# Patient Record
(demographics unavailable — no encounter records)

---

## 2025-01-24 NOTE — PLAN
[FreeTextEntry1] : Umbilical hernia. Elective repair. Pt accepts post stress test. total time > 47 minutes

## 2025-01-24 NOTE — HISTORY OF PRESENT ILLNESS
[de-identified] : 61 yo male with an umbilical hernia. Was admitted to  last week. Had CT for ? SBO. Resolved without surgery. Here to consider elective repair.

## 2025-01-24 NOTE — CONSULT LETTER
[Dear  ___] : Dear  [unfilled], [Consult Letter:] : I had the pleasure of evaluating your patient, [unfilled]. [Please see my note below.] : Please see my note below. [Consult Closing:] : Thank you very much for allowing me to participate in the care of this patient.  If you have any questions, please do not hesitate to contact me. [Sincerely,] : Sincerely, [FreeTextEntry3] : Wm Xavier OVALLE

## 2025-01-24 NOTE — PHYSICAL EXAM
[JVD] : no jugular venous distention  [Normal Breath Sounds] : Normal breath sounds [Normal Heart Sounds] : normal heart sounds [Abdomen Tenderness] : ~T ~M No abdominal tenderness [No HSM] : no hepatosplenomegaly [No Rash or Lesion] : No rash or lesion [Alert] : alert [Oriented to Person] : oriented to person [Oriented to Place] : oriented to place [Oriented to Time] : oriented to time [Calm] : calm [de-identified] : NAD [de-identified] : NC/AT PER [de-identified] : soft, obese, reducible hernia [de-identified] : no CVA tenderness [de-identified] : moves all 4 extr 5/5

## 2025-03-31 NOTE — PHYSICAL EXAM
[Abdomen Tenderness] : ~T ~M No abdominal tenderness [Calm] : calm [de-identified] : some dark skin at umbilicus

## 2025-03-31 NOTE — HISTORY OF PRESENT ILLNESS
[de-identified] : 61 yo male POD# 3 from umbilical hernia repair. PAT drain 15 to 40 cc per 12 hours.

## 2025-04-30 NOTE — PHYSICAL EXAM
[General Appearance - Well Developed] : well developed [General Appearance - Well Nourished] : well nourished [Heart Rate And Rhythm] : heart rate and rhythm were normal [] : no respiratory distress [Abdomen Soft] : soft [Urethral Meatus] : meatus normal [Penis Abnormality] : normal circumcised penis [Epididymis] : the epididymides were normal [Testes Tenderness] : no tenderness of the testes [Testes Mass (___cm)] : there were no testicular masses [Normal Station and Gait] : the gait and station were normal for the patient's age [Skin Color & Pigmentation] : normal skin color and pigmentation [No Focal Deficits] : no focal deficits [Oriented To Time, Place, And Person] : oriented to person, place, and time [Not Anxious] : not anxious

## 2025-04-30 NOTE — ASSESSMENT
[FreeTextEntry1] : 59 yo male with BPH/LUTS and elevated PVR.  He is having acute symptoms of prostatitis with a negative Ucx.  Will switch abx to Bactrim, he will return if he sees no improvement after 7days of abx.  Will resend UA and UCx as well.  He is scheduled for HoLEP with Dr. Valle in June.

## 2025-04-30 NOTE — HISTORY OF PRESENT ILLNESS
[FreeTextEntry1] : 3/23/22: 58 yo male presents with concern for prostatitis -like symptoms that first started in late December. He has seen two other urologists who treated him for a UTI at first and then prostatitis,.  He was found to have salmonella in one of his urine cultures.  He was treated with a 2 week course of Bactrim.  His symptoms resolved.  This past Thursday and Friday he again developed prostatitis -like symptoms with dysuria and urinary frequency..  However, these symptoms resolved on their own.  His most recent Ucx was negative (3/17/22) and his PSA from 1/12/22 was 2.6.  At baseline he has LUTS consisting of urinary frequency, urgency, occasional urge incontinence, hesitancy and slow stream.  he has been on tamsulosin 0.4 mg for several years.    IPSS = 28 QoL = 4 PVR = 141 cc  *************** 6/27/22: 58 yo male returns with new complaints of prostatitis -like symptoms with perineal and perianal discomfort as well as dysuria and increased difficulty voiding.  He denies fevers chills nausea and vomiting.  The UA and Ucx at his last visit was negative.  CTU from April was also unremarkable.  He generally voids well with tamsulosin and he has been taking the medication consistently on a daily basis.   PVR = 63 cc Urine dip + for nitrites  *************** 11/16/22: 58 yr old male with CC of acute onset urinary frequency, urgency and dysuria which began on Friday 11/11. He denies fever, chills, or gross hematuria. He started taking leftover Bactrim antibiotics that he had from ID on Sunday and the last dose was last night on Tuesday 11/15/22. His symptoms are improved since beginning the abx. He has a history of Salmonella UTI which he has been treated with Bactrim by ID for 1 month (6 tabs/daily). He was doing well since end of September, but then symptoms returned.   He is interested in discussing prostate procedures due to the recurrent infections. The length of the prostate on recent CT pelvis with IV contrast was negative for abscess and showed enlarged prostate measuring 6.5 cm. No volume recorded. Done at [facility] imaging. Right kidney on US showed no stones.   UA: trace WBCs PVR: 236 ml, 2nd void 182 ml   ************* 12/29/22: 57 yo male with hx of BPH/LUTS on tamsulosin and recurrent salmonella infections returns with acute worsening of urinary frequency.  He thinks he may have another salmonella infection.  He denies fevers, chills, nausea, vomiting, or hematuria.  He completed a 1 month course of high dose Bactrim, prescribed by his ID doctor, to try to eliminate his salmonella infection.  A Ucx from 12/9/22 was negative.  PVR = 35 cc  ******************** 4/26/23: Patient returns to discuss options for his chronic LUTS and infections. He recently finished a 6 week course of home IV antibiotics and had a negative UCx. His voiding pattern is essentially unchanged. He has frequency, urgency, weak stream, hesitancy, and nocturia x2. His symptoms are worse at night.   PVR: 184 ml IPSS: 24, QOL: 4   ********************* 5/8/23: Patient returns for follow up/  He has had 2 negative urine cultures since completing 6 weeks of abx.  His most recent PSA was from 5/3/23 = 1.5.  He is voiding with his baseline LUTS.   Cystoscopy today reveals a large obstructing prostate with enlarged median lobe, TRUS measured the prostate at 138 cc.   ************************ 8/16/23: referred by Dr. Gaming to discuss prostate debulking procedures. No recurrent infections. Remains bothered by frequency, urgency and in particular occasional difficulty urinating.   IPSS 24, QOL 5, PVR 2 cc  ******************* 2/11/25: Returns with worsening LUTS, started taking flomax 0.8 mg daily which has helped. Still with frequency, urgency, occasional urge incontinence, difficulty urinating, incomplete emptying.   cc  ******** 4/30/25: Patient returns with 10 days of perineal pain and dysuria with voiding.  he tried a 7 day course of cipro but saw no improvement.  His PCP sent a UA and Ucx which was negative for bacteria, but did show microscopic hematuria.  He denies fevers or chills.

## 2025-05-12 NOTE — HISTORY OF PRESENT ILLNESS
[FreeTextEntry1] : 3/23/22: 56 yo male presents with concern for prostatitis -like symptoms that first started in late December. He has seen two other urologists who treated him for a UTI at first and then prostatitis,.  He was found to have salmonella in one of his urine cultures.  He was treated with a 2 week course of Bactrim.  His symptoms resolved.  This past Thursday and Friday he again developed prostatitis -like symptoms with dysuria and urinary frequency..  However, these symptoms resolved on their own.  His most recent Ucx was negative (3/17/22) and his PSA from 1/12/22 was 2.6.  At baseline he has LUTS consisting of urinary frequency, urgency, occasional urge incontinence, hesitancy and slow stream.  he has been on tamsulosin 0.4 mg for several years.    IPSS = 28 QoL = 4 PVR = 141 cc  *************** 6/27/22: 56 yo male returns with new complaints of prostatitis -like symptoms with perineal and perianal discomfort as well as dysuria and increased difficulty voiding.  He denies fevers chills nausea and vomiting.  The UA and Ucx at his last visit was negative.  CTU from April was also unremarkable.  He generally voids well with tamsulosin and he has been taking the medication consistently on a daily basis.   PVR = 63 cc Urine dip + for nitrites  *************** 11/16/22: 58 yr old male with CC of acute onset urinary frequency, urgency and dysuria which began on Friday 11/11. He denies fever, chills, or gross hematuria. He started taking leftover Bactrim antibiotics that he had from ID on Sunday and the last dose was last night on Tuesday 11/15/22. His symptoms are improved since beginning the abx. He has a history of Salmonella UTI which he has been treated with Bactrim by ID for 1 month (6 tabs/daily). He was doing well since end of September, but then symptoms returned.   He is interested in discussing prostate procedures due to the recurrent infections. The length of the prostate on recent CT pelvis with IV contrast was negative for abscess and showed enlarged prostate measuring 6.5 cm. No volume recorded. Done at [facility] imaging. Right kidney on US showed no stones.   UA: trace WBCs PVR: 236 ml, 2nd void 182 ml   ************* 12/29/22: 57 yo male with hx of BPH/LUTS on tamsulosin and recurrent salmonella infections returns with acute worsening of urinary frequency.  He thinks he may have another salmonella infection.  He denies fevers, chills, nausea, vomiting, or hematuria.  He completed a 1 month course of high dose Bactrim, prescribed by his ID doctor, to try to eliminate his salmonella infection.  A Ucx from 12/9/22 was negative.  PVR = 35 cc  ******************** 4/26/23: Patient returns to discuss options for his chronic LUTS and infections. He recently finished a 6 week course of home IV antibiotics and had a negative UCx. His voiding pattern is essentially unchanged. He has frequency, urgency, weak stream, hesitancy, and nocturia x2. His symptoms are worse at night.   PVR: 184 ml IPSS: 24, QOL: 4   ********************* 5/8/23: Patient returns for follow up/  He has had 2 negative urine cultures since completing 6 weeks of abx.  His most recent PSA was from 5/3/23 = 1.5.  He is voiding with his baseline LUTS.   Cystoscopy today reveals a large obstructing prostate with enlarged median lobe, TRUS measured the prostate at 138 cc.   ************************ 8/16/23: referred by Dr. Gaming to discuss prostate debulking procedures. No recurrent infections. Remains bothered by frequency, urgency and in particular occasional difficulty urinating.   IPSS 24, QOL 5, PVR 2 cc  ******************* 2/11/25: Returns with worsening LUTS, started taking flomax 0.8 mg daily which has helped. Still with frequency, urgency, occasional urge incontinence, difficulty urinating, incomplete emptying.   cc  ******** 4/30/25: Patient returns with 10 days of perineal pain and dysuria with voiding.  he tried a 7 day course of cipro but saw no improvement.  His PCP sent a UA and Ucx which was negative for bacteria, but did show microscopic hematuria.  He denies fevers or chills.   ************ 5/12/25: Patient returns to review CT scan.  CT shows serval large bladder stones.  This is likely the cause of his pain.  He is scheduled for surgery in June for HoLEP and cystolithotripsy.  NSAIDs, phenazopyridine, and tamsulosin have been minimally effective.

## 2025-05-12 NOTE — ASSESSMENT
[FreeTextEntry1] : 61 yo male with BPH/LUTS, urinary retention and bladder stones.  His symptoms are related to the stones.  he is scheduled for surgery in June.  He will continue to address his symptoms with medications provided and I will send in an Rx for hydrocodone to be used only with severe pain.  He will follow up with Dr. Valle as scheduled for surgery.

## 2025-07-22 NOTE — HISTORY OF PRESENT ILLNESS
[FreeTextEntry1] : 3/23/22: 58 yo male presents with concern for prostatitis -like symptoms that first started in late December. He has seen two other urologists who treated him for a UTI at first and then prostatitis,.  He was found to have salmonella in one of his urine cultures.  He was treated with a 2 week course of Bactrim.  His symptoms resolved.  This past Thursday and Friday he again developed prostatitis -like symptoms with dysuria and urinary frequency..  However, these symptoms resolved on their own.  His most recent Ucx was negative (3/17/22) and his PSA from 1/12/22 was 2.6.  At baseline he has LUTS consisting of urinary frequency, urgency, occasional urge incontinence, hesitancy and slow stream.  he has been on tamsulosin 0.4 mg for several years.    IPSS = 28 QoL = 4 PVR = 141 cc  *************** 6/27/22: 58 yo male returns with new complaints of prostatitis -like symptoms with perineal and perianal discomfort as well as dysuria and increased difficulty voiding.  He denies fevers chills nausea and vomiting.  The UA and Ucx at his last visit was negative.  CTU from April was also unremarkable.  He generally voids well with tamsulosin and he has been taking the medication consistently on a daily basis.   PVR = 63 cc Urine dip + for nitrites  *************** 11/16/22: 58 yr old male with CC of acute onset urinary frequency, urgency and dysuria which began on Friday 11/11. He denies fever, chills, or gross hematuria. He started taking leftover Bactrim antibiotics that he had from ID on Sunday and the last dose was last night on Tuesday 11/15/22. His symptoms are improved since beginning the abx. He has a history of Salmonella UTI which he has been treated with Bactrim by ID for 1 month (6 tabs/daily). He was doing well since end of September, but then symptoms returned.   He is interested in discussing prostate procedures due to the recurrent infections. The length of the prostate on recent CT pelvis with IV contrast was negative for abscess and showed enlarged prostate measuring 6.5 cm. No volume recorded. Done at [facility] imaging. Right kidney on US showed no stones.   UA: trace WBCs PVR: 236 ml, 2nd void 182 ml   ************* 12/29/22: 57 yo male with hx of BPH/LUTS on tamsulosin and recurrent salmonella infections returns with acute worsening of urinary frequency.  He thinks he may have another salmonella infection.  He denies fevers, chills, nausea, vomiting, or hematuria.  He completed a 1 month course of high dose Bactrim, prescribed by his ID doctor, to try to eliminate his salmonella infection.  A Ucx from 12/9/22 was negative.  PVR = 35 cc  ******************** 4/26/23: Patient returns to discuss options for his chronic LUTS and infections. He recently finished a 6 week course of home IV antibiotics and had a negative UCx. His voiding pattern is essentially unchanged. He has frequency, urgency, weak stream, hesitancy, and nocturia x2. His symptoms are worse at night.   PVR: 184 ml IPSS: 24, QOL: 4   ********************* 5/8/23: Patient returns for follow up/  He has had 2 negative urine cultures since completing 6 weeks of abx.  His most recent PSA was from 5/3/23 = 1.5.  He is voiding with his baseline LUTS.   Cystoscopy today reveals a large obstructing prostate with enlarged median lobe, TRUS measured the prostate at 138 cc.   ************************ 8/16/23: referred by Dr. Gaming to discuss prostate debulking procedures. No recurrent infections. Remains bothered by frequency, urgency and in particular occasional difficulty urinating.   IPSS 24, QOL 5, PVR 2 cc  ******************* 2/11/25: Returns with worsening LUTS, started taking flomax 0.8 mg daily which has helped. Still with frequency, urgency, occasional urge incontinence, difficulty urinating, incomplete emptying.   cc  ******** 4/30/25: Patient returns with 10 days of perineal pain and dysuria with voiding.  he tried a 7 day course of cipro but saw no improvement.  His PCP sent a UA and Ucx which was negative for bacteria, but did show microscopic hematuria.  He denies fevers or chills.   ************ 5/12/25: Patient returns to review CT scan.  CT shows serval large bladder stones.  This is likely the cause of his pain.  He is scheduled for surgery in June for HoLEP and cystolithotripsy.  NSAIDs, phenazopyridine, and tamsulosin have been minimally effective.    7/21/25: S/p HoLEP at [facility] on 6/19/25. Doing well with strong stream, resolved hematuria, no stress incontinence, some urgency with urge incontinence. Having some rectal pain. Nocturia x2 improved from x5.    IPSS:9/3 PVR: 8 ml (s/p HoLEP)

## 2025-07-22 NOTE — HISTORY OF PRESENT ILLNESS
[FreeTextEntry1] : 3/23/22: 56 yo male presents with concern for prostatitis -like symptoms that first started in late December. He has seen two other urologists who treated him for a UTI at first and then prostatitis,.  He was found to have salmonella in one of his urine cultures.  He was treated with a 2 week course of Bactrim.  His symptoms resolved.  This past Thursday and Friday he again developed prostatitis -like symptoms with dysuria and urinary frequency..  However, these symptoms resolved on their own.  His most recent Ucx was negative (3/17/22) and his PSA from 1/12/22 was 2.6.  At baseline he has LUTS consisting of urinary frequency, urgency, occasional urge incontinence, hesitancy and slow stream.  he has been on tamsulosin 0.4 mg for several years.    IPSS = 28 QoL = 4 PVR = 141 cc  *************** 6/27/22: 56 yo male returns with new complaints of prostatitis -like symptoms with perineal and perianal discomfort as well as dysuria and increased difficulty voiding.  He denies fevers chills nausea and vomiting.  The UA and Ucx at his last visit was negative.  CTU from April was also unremarkable.  He generally voids well with tamsulosin and he has been taking the medication consistently on a daily basis.   PVR = 63 cc Urine dip + for nitrites  *************** 11/16/22: 58 yr old male with CC of acute onset urinary frequency, urgency and dysuria which began on Friday 11/11. He denies fever, chills, or gross hematuria. He started taking leftover Bactrim antibiotics that he had from ID on Sunday and the last dose was last night on Tuesday 11/15/22. His symptoms are improved since beginning the abx. He has a history of Salmonella UTI which he has been treated with Bactrim by ID for 1 month (6 tabs/daily). He was doing well since end of September, but then symptoms returned.   He is interested in discussing prostate procedures due to the recurrent infections. The length of the prostate on recent CT pelvis with IV contrast was negative for abscess and showed enlarged prostate measuring 6.5 cm. No volume recorded. Done at [facility] imaging. Right kidney on US showed no stones.   UA: trace WBCs PVR: 236 ml, 2nd void 182 ml   ************* 12/29/22: 59 yo male with hx of BPH/LUTS on tamsulosin and recurrent salmonella infections returns with acute worsening of urinary frequency.  He thinks he may have another salmonella infection.  He denies fevers, chills, nausea, vomiting, or hematuria.  He completed a 1 month course of high dose Bactrim, prescribed by his ID doctor, to try to eliminate his salmonella infection.  A Ucx from 12/9/22 was negative.  PVR = 35 cc  ******************** 4/26/23: Patient returns to discuss options for his chronic LUTS and infections. He recently finished a 6 week course of home IV antibiotics and had a negative UCx. His voiding pattern is essentially unchanged. He has frequency, urgency, weak stream, hesitancy, and nocturia x2. His symptoms are worse at night.   PVR: 184 ml IPSS: 24, QOL: 4   ********************* 5/8/23: Patient returns for follow up/  He has had 2 negative urine cultures since completing 6 weeks of abx.  His most recent PSA was from 5/3/23 = 1.5.  He is voiding with his baseline LUTS.   Cystoscopy today reveals a large obstructing prostate with enlarged median lobe, TRUS measured the prostate at 138 cc.   ************************ 8/16/23: referred by Dr. Gaming to discuss prostate debulking procedures. No recurrent infections. Remains bothered by frequency, urgency and in particular occasional difficulty urinating.   IPSS 24, QOL 5, PVR 2 cc  ******************* 2/11/25: Returns with worsening LUTS, started taking flomax 0.8 mg daily which has helped. Still with frequency, urgency, occasional urge incontinence, difficulty urinating, incomplete emptying.   cc  ******** 4/30/25: Patient returns with 10 days of perineal pain and dysuria with voiding.  he tried a 7 day course of cipro but saw no improvement.  His PCP sent a UA and Ucx which was negative for bacteria, but did show microscopic hematuria.  He denies fevers or chills.   ************ 5/12/25: Patient returns to review CT scan.  CT shows serval large bladder stones.  This is likely the cause of his pain.  He is scheduled for surgery in June for HoLEP and cystolithotripsy.  NSAIDs, phenazopyridine, and tamsulosin have been minimally effective.    7/21/25: S/p HoLEP at [facility] on 6/19/25. Doing well with strong stream, resolved hematuria, no stress incontinence, some urgency with urge incontinence. Having some rectal pain. Nocturia x2 improved from x5.    IPSS:9/3 PVR: 8 ml (s/p HoLEP)

## 2025-07-22 NOTE — END OF VISIT
[FreeTextEntry3] :   I, Dr. Valle, personally performed the evaluation and management (E/M) services for this established patient who presents today with an exacerbation of an existing condition. That E/M includes conducting the examination, assessing all exacerbated conditions, and establishing a plan of care.  Today, my ACP, Charisma Lyons, was here to observe my evaluation and management services for this exacerbated condition to be followed going forward.

## 2025-07-22 NOTE — ASSESSMENT
[FreeTextEntry1] : 59 yo male with BPH/LUTS, urinary retention and bladder stones. 138 cc prostate.   S/p HoLEP w/ cystolitholopaxy at [facility] on 6/19/25. Successful TOV POD-1. Pathology with 106 cc BPH. Doing well with strong stream, complete emptying, no hematuria, no HARJINDER some urgency with UUI but overall symptoms improved. Having some rectal pain/pain with ejaculation. Discussed may still be inflammation/healing post-surgery. Will monitor and check urine for infection.   - UCx - RTC 2 months (IPSS, UA, PVR, PSA)

## 2025-07-22 NOTE — HISTORY OF PRESENT ILLNESS
[FreeTextEntry1] : 3/23/22: 56 yo male presents with concern for prostatitis -like symptoms that first started in late December. He has seen two other urologists who treated him for a UTI at first and then prostatitis,.  He was found to have salmonella in one of his urine cultures.  He was treated with a 2 week course of Bactrim.  His symptoms resolved.  This past Thursday and Friday he again developed prostatitis -like symptoms with dysuria and urinary frequency..  However, these symptoms resolved on their own.  His most recent Ucx was negative (3/17/22) and his PSA from 1/12/22 was 2.6.  At baseline he has LUTS consisting of urinary frequency, urgency, occasional urge incontinence, hesitancy and slow stream.  he has been on tamsulosin 0.4 mg for several years.    IPSS = 28 QoL = 4 PVR = 141 cc  *************** 6/27/22: 56 yo male returns with new complaints of prostatitis -like symptoms with perineal and perianal discomfort as well as dysuria and increased difficulty voiding.  He denies fevers chills nausea and vomiting.  The UA and Ucx at his last visit was negative.  CTU from April was also unremarkable.  He generally voids well with tamsulosin and he has been taking the medication consistently on a daily basis.   PVR = 63 cc Urine dip + for nitrites  *************** 11/16/22: 58 yr old male with CC of acute onset urinary frequency, urgency and dysuria which began on Friday 11/11. He denies fever, chills, or gross hematuria. He started taking leftover Bactrim antibiotics that he had from ID on Sunday and the last dose was last night on Tuesday 11/15/22. His symptoms are improved since beginning the abx. He has a history of Salmonella UTI which he has been treated with Bactrim by ID for 1 month (6 tabs/daily). He was doing well since end of September, but then symptoms returned.   He is interested in discussing prostate procedures due to the recurrent infections. The length of the prostate on recent CT pelvis with IV contrast was negative for abscess and showed enlarged prostate measuring 6.5 cm. No volume recorded. Done at [facility] imaging. Right kidney on US showed no stones.   UA: trace WBCs PVR: 236 ml, 2nd void 182 ml   ************* 12/29/22: 59 yo male with hx of BPH/LUTS on tamsulosin and recurrent salmonella infections returns with acute worsening of urinary frequency.  He thinks he may have another salmonella infection.  He denies fevers, chills, nausea, vomiting, or hematuria.  He completed a 1 month course of high dose Bactrim, prescribed by his ID doctor, to try to eliminate his salmonella infection.  A Ucx from 12/9/22 was negative.  PVR = 35 cc  ******************** 4/26/23: Patient returns to discuss options for his chronic LUTS and infections. He recently finished a 6 week course of home IV antibiotics and had a negative UCx. His voiding pattern is essentially unchanged. He has frequency, urgency, weak stream, hesitancy, and nocturia x2. His symptoms are worse at night.   PVR: 184 ml IPSS: 24, QOL: 4   ********************* 5/8/23: Patient returns for follow up/  He has had 2 negative urine cultures since completing 6 weeks of abx.  His most recent PSA was from 5/3/23 = 1.5.  He is voiding with his baseline LUTS.   Cystoscopy today reveals a large obstructing prostate with enlarged median lobe, TRUS measured the prostate at 138 cc.   ************************ 8/16/23: referred by Dr. Gaming to discuss prostate debulking procedures. No recurrent infections. Remains bothered by frequency, urgency and in particular occasional difficulty urinating.   IPSS 24, QOL 5, PVR 2 cc  ******************* 2/11/25: Returns with worsening LUTS, started taking flomax 0.8 mg daily which has helped. Still with frequency, urgency, occasional urge incontinence, difficulty urinating, incomplete emptying.   cc  ******** 4/30/25: Patient returns with 10 days of perineal pain and dysuria with voiding.  he tried a 7 day course of cipro but saw no improvement.  His PCP sent a UA and Ucx which was negative for bacteria, but did show microscopic hematuria.  He denies fevers or chills.   ************ 5/12/25: Patient returns to review CT scan.  CT shows serval large bladder stones.  This is likely the cause of his pain.  He is scheduled for surgery in June for HoLEP and cystolithotripsy.  NSAIDs, phenazopyridine, and tamsulosin have been minimally effective.    7/21/25: S/p HoLEP at [facility] on 6/19/25. Doing well with strong stream, resolved hematuria, no stress incontinence, some urgency with urge incontinence. Having some rectal pain. Nocturia x2 improved from x5.    IPSS:9/3 PVR: 8 ml (s/p HoLEP)

## 2025-07-22 NOTE — ASSESSMENT
[FreeTextEntry1] : 59 yo male with BPH/LUTS, urinary retention and bladder stones. 138 cc prostate.   S/p HoLEP w/ cystolitholopaxy at [facility] on 6/19/25. Successful TOV POD-1. Pathology with 106 cc BPH. Doing well with strong stream, complete emptying, no hematuria, no HARJINDER some urgency with UUI but overall symptoms improved. Having some rectal pain/pain with ejaculation. Discussed may still be inflammation/healing post-surgery. Will monitor and check urine for infection.   - UCx - RTC 2 months (IPSS, UA, PVR, PSA)

## 2025-07-22 NOTE — ASSESSMENT
[FreeTextEntry1] : 61 yo male with BPH/LUTS, urinary retention and bladder stones. 138 cc prostate.   S/p HoLEP w/ cystolitholopaxy at [facility] on 6/19/25. Successful TOV POD-1. Pathology with 106 cc BPH. Doing well with strong stream, complete emptying, no hematuria, no HARJINDER some urgency with UUI but overall symptoms improved. Having some rectal pain/pain with ejaculation. Discussed may still be inflammation/healing post-surgery. Will monitor and check urine for infection.   - UCx - RTC 2 months (IPSS, UA, PVR, PSA)